# Patient Record
Sex: FEMALE | Race: WHITE | Employment: STUDENT | URBAN - METROPOLITAN AREA
[De-identification: names, ages, dates, MRNs, and addresses within clinical notes are randomized per-mention and may not be internally consistent; named-entity substitution may affect disease eponyms.]

---
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
## 2025-05-08 ENCOUNTER — APPOINTMENT (OUTPATIENT)
Dept: GENERAL RADIOLOGY | Age: 20
End: 2025-05-08
Attending: NURSE PRACTITIONER
Payer: MEDICAID

## 2025-05-08 ENCOUNTER — HOSPITAL ENCOUNTER (OUTPATIENT)
Age: 20
Discharge: HOME | End: 2025-05-08
Payer: MEDICAID

## 2025-05-08 VITALS
DIASTOLIC BLOOD PRESSURE: 71 MMHG | RESPIRATION RATE: 16 BRPM | OXYGEN SATURATION: 98 % | HEART RATE: 82 BPM | TEMPERATURE: 99 F | SYSTOLIC BLOOD PRESSURE: 114 MMHG

## 2025-05-08 DIAGNOSIS — S93.401A SPRAIN OF RIGHT ANKLE, UNSPECIFIED LIGAMENT, INITIAL ENCOUNTER: Primary | ICD-10-CM

## 2025-05-08 PROCEDURE — 73610 X-RAY EXAM OF ANKLE: CPT | Performed by: NURSE PRACTITIONER

## 2025-05-08 PROCEDURE — L4350 ANKLE CONTROL ORTHO PRE OTS: HCPCS | Performed by: NURSE PRACTITIONER

## 2025-05-08 PROCEDURE — 99213 OFFICE O/P EST LOW 20 MIN: CPT | Performed by: NURSE PRACTITIONER

## 2025-05-08 PROCEDURE — 73630 X-RAY EXAM OF FOOT: CPT | Performed by: NURSE PRACTITIONER

## 2025-05-08 NOTE — ED PROVIDER NOTES
He    Patient Seen in: [facility]      History     Chief Complaint   Patient presents with    Ankle Pain     Stated Complaint: Rt ankle injury swollen/bruised  Subjective:   19-year-old healthy female presents for a right ankle and foot injury.  She states she stepped wrong yesterday and rolled her right ankle.  She is having pain with swelling to the lateral aspect of the right ankle that goes into the lateral aspect of the foot.  Bruising and swelling is present.  No visible deformity.  No history of a fracture to this ankle or foot in the past.  No Achilles pain.  No heel pain.  No calf pain.  Able to bear weight.  No numbness or tingling.  She appears nontoxic.      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  Constitutional and vital signs reviewed.      All other systems reviewed and negative except as noted above.    Physical Exam     ED Triage Vitals [25 1159]   /71   Pulse 82   Resp 16   Temp 98.5 °F (36.9 °C)   Temp src Oral   SpO2 98 %   O2 Device None (Room air)     Current:/71   Pulse 82   Temp 98.5 °F (36.9 °C) (Oral)   Resp 16   LMP  (Within Weeks)   SpO2 98%     Physical Exam  Vitals and nursing note reviewed.   Constitutional:       General: She is not in acute distress.     Appearance: Normal appearance. She is not toxic-appearing.   Pulmonary:      Effort: Pulmonary effort is  normal.   Musculoskeletal:         General: Swelling, tenderness and signs of injury present. No deformity.      Comments: Pain to the right lateral ankle and lateral foot with ROM and ambulation.  Bruising and swelling is present.  No visible deformity.  Moving the toes without difficulty.  No Achilles pain.  No heel pain.  No calf pain.  CMS intact   Skin:     General: Skin is warm and dry.      Capillary Refill: Capillary refill takes less than 2 seconds.      Findings: Bruising present.   Neurological:      General: No focal deficit present.      Mental Status: She is alert and oriented to person, place, and time.   Psychiatric:         Mood and Affect: Mood normal.         Behavior: Behavior normal.         ED Course   XR ANKLE (MIN 3 VIEWS), RIGHT (CPT=73610)  Result Date: 5/8/2025  CONCLUSION:   No acute fracture or dislocation.  Lateral soft tissue swelling.    Dictated by (CST): Renny Spring MD on 5/08/2025 at 12:29 PM     Finalized by (CST): Renny Spring MD on 5/08/2025 at 12:30 PM          XR FOOT, COMPLETE (MIN 3 VIEWS), RIGHT (CPT=73630)  Result Date: 5/8/2025  CONCLUSION: No acute fracture or dislocation.    Dictated by (CST): Renny Spring MD on 5/08/2025 at 12:28 PM     Finalized by (CST): Renny Spring MD on 5/08/2025 at 12:29 PM          Labs Reviewed - No data to display    MDM     Medical Decision Making  The patient is aware of the results below.  We discussed that her injury is most likely a sprain.  An ankle Aircast was applied.  CMS intact post application.  We discussed supportive care including ice, elevation, and ibuprofen as needed for pain.  I will give her the Ortho  line to call if her symptoms persist or worsen.    Amount and/or Complexity of Data Reviewed  Radiology: ordered.     Details: I personally visualized the x-ray images of the right foot and ankle which are negative for any acute fracture.    Risk  OTC drugs.  Risk Details: Fracture versus  sprain        Disposition and Plan     Clinical Impression:  1. Sprain of right ankle, unspecified ligament, initial encounter         Disposition:  Discharge  5/8/2025 12:49 pm    Follow-up:  Orthopedics/Sports Medicine  To schedule an appointment with the Orthopedic and Sports Medicine department; please text or call [phone] and choose option 3 when prompted.              Medications Prescribed:  There are no discharge medications for this patient.

## 2025-05-08 NOTE — DISCHARGE INSTRUCTIONS
Ice and elevate the ankle.  Wear the Aircast during the day for comfort and support.  Ibuprofen as needed for pain.  If your pain persists or worsens, call the orthopedic  line given to you to arrange follow-up with orthopedics.

## 2025-05-08 NOTE — ED INITIAL ASSESSMENT (HPI)
Pt with R ankle pain since Tuesday after she \"stepped wrong\". Pt denied pain at rest, rated 3/10 with ambulation.

## (undated) NOTE — ED AVS SNAPSHOT
[facility] Emergency Department    [address]     [place] [address]    Phone:  [phone]    Fax:  [phone]           Jeremyniurkaadánisaac Chaim   MRN: [id number]    Department:  [facility] Emergency Department   Date of Visit:  1/17/ and Class Registration line at [phone] or find a doctor online by visiting [email or web address].    IF THERE IS ANY CHANGE OR WORSENING OF YOUR CONDITION, CALL YOUR PRIMARY CARE PHYSICIAN AT ONCE OR RETURN IMMEDIATELY TO [address] [place] [address].     If

## (undated) NOTE — LETTER
Date & Time: 9/11/2018, 6:29 PM  Patient: Rachel Jama  Encounter Provider(s):    Hallie Casas MD       To Whom It May Concern:    Rachel Jama was seen and treated in our department on 9/11/2018.  She should not return to school until [place]

## (undated) NOTE — ED AVS SNAPSHOT
Parent/Legal Guardian Access to the Online [employer]og[employer] Record of a Patient 15to 16Years Old  Return completed form by Secure email to [facility]/Medical Records Department: [email or web address].     Requirements and Procedures   Under [facility] MyChart ID and password with another person, that person may be able to view my or my child’s health information, and health information about someone who has authorized me as a MyChart proxy.    ·  I agree that it is my responsibility to select a confident Sign-Up Form and I agree to its terms.        Authorization Form     Please enter Patient’s information below:   Name (last, first, middle initial) __________________________________________   Gender  Male  Female    Last 4 Digits of Social Security Number Parent/Legal Guardian Signature                                  For Patient (1517 years of age)  I agree to allow my parent/legal guardian, named above, online access to my medical information currently available and that may become available as a result

## (undated) NOTE — ED AVS SNAPSHOT
[facility] Emergency Department    [address]     [place] [address]    Phone:  [phone]    Fax:  [phone]           Tate Solorzanot   MRN: [id number]    Department:  [facility] Emergency Department   Date of Visit:  1/17/ If you have difficulty scheduling your follow-up appointment as directed, please call our  at [phone]. Si tiene problemas para programar flores estrella de seguimiento según lo indicado, llame al encargado de nathan al [phone].     It i continue to take your medications as instructed by your Primary Care doctor until you can check with your doctor. Please bring the medication list to your next doctor's appointment.     Any imaging studies and labs completed today can be reviewed in your M Sign up for [employer] access for your child. [employer] access allows you to view health information for your child from their recent   visit, view other health information and more.   To sign up or find more information on getting   Proxy Access to your child

## (undated) NOTE — LETTER
AUTHORIZATION FOR SURGICAL OPERATION OR OTHER PROCEDURE    1. I hereby authorize Kanchan Peterson, and [employer] [employer] [facility] staff assigned to my case to perform the following operation and/or procedure at the [facility], [facility]:    _______________________________________________________________________________________________  Nexplanon Insertion    _______________________________________________________________________________________________    2. My physician has explained the nature and purpose of the operation or other procedure, possible alternative methods of treatment, the risks involved, and the possibility of complication to me. I acknowledge that no guarantee has been made as to the result that may be obtained. 3.  I recognize that, during the course of this operation, or other procedure, unforseen conditions may necessitate additional or different procedure than those listed above. I, therefore, further authorize and request that the above named physician, his/her physician assistants or designees perform such procedures as are, in his/her professional opinion, necessary and desirable. 4.  Any tissue or organs removed in the operation or other procedure may be disposed of by and at the discretion of the [facility], [facility] and [facility] AT [facility]. 5.  I understand that in the event of a medical emergency, I will be transported by local paramedics to [facility] or other [facility] emergency department. 6.  I certify that I have read and fully understand the above consent to operation and/or other procedure. 7.  I acknowledge that my physician has explained sedation/analgesia administration to me including the risks and benefits. I consent to the administration of sedation/analgesia as may be necessary or desirable in the judgement of my physician.     Witness signature: ___________________________________________________ Date:  ______/______/_____                    Time: ________ A. M.  P.M. Patient Name:  ______________________________________________________  (please print)      Patient signature:  ___________________________________________________             Relationship to Patient:           []  Parent    Responsible person                          []  Spouse  In case of minor or                    [] Other  _____________   Incompetent name:  __________________________________________________                               (please print)      _____________      Responsible person  In case of minor or  Incompetent signature:  _______________________________________________    Statement of Physician  My signature below affirms that prior to the time of the procedure, I have explained to the patient and/or his/her guardian, the risks and benefits involved in the proposed treatment and any reasonable alternative to the proposed treatment. I have also explained the risks and benefits involved in the refusal of the proposed treatment and have answered the patient's questions.                         Date:  ______/______/_______  Provider                      Signature:  __________________________________________________________       Time:  ___________ A.M    P.M.